# Patient Record
Sex: FEMALE | Race: WHITE | NOT HISPANIC OR LATINO | Employment: FULL TIME | URBAN - METROPOLITAN AREA
[De-identification: names, ages, dates, MRNs, and addresses within clinical notes are randomized per-mention and may not be internally consistent; named-entity substitution may affect disease eponyms.]

---

## 2023-02-16 PROBLEM — J45.909 ASTHMA (HHS-HCC): Status: ACTIVE | Noted: 2023-02-16

## 2023-02-16 PROBLEM — I10 HYPERTENSION: Status: ACTIVE | Noted: 2023-02-16

## 2023-02-16 PROBLEM — E66.01 CLASS 2 SEVERE OBESITY WITH SERIOUS COMORBIDITY AND BODY MASS INDEX (BMI) OF 36.0 TO 36.9 IN ADULT (MULTI): Status: ACTIVE | Noted: 2023-02-16

## 2023-02-16 PROBLEM — E10.9 TYPE 1 DIABETES MELLITUS (MULTI): Status: ACTIVE | Noted: 2023-02-16

## 2023-02-16 PROBLEM — E78.5 HYPERLIPIDEMIA: Status: ACTIVE | Noted: 2023-02-16

## 2023-02-16 PROBLEM — E66.812 CLASS 2 SEVERE OBESITY WITH SERIOUS COMORBIDITY AND BODY MASS INDEX (BMI) OF 36.0 TO 36.9 IN ADULT: Status: ACTIVE | Noted: 2023-02-16

## 2023-02-16 PROBLEM — N64.89 BREAST ASYMMETRY: Status: ACTIVE | Noted: 2023-02-16

## 2023-02-16 RX ORDER — INSULIN DETEMIR 100 [IU]/ML
22 INJECTION, SOLUTION SUBCUTANEOUS NIGHTLY
COMMUNITY
Start: 2019-02-25

## 2023-02-16 RX ORDER — FLUTICASONE PROPIONATE AND SALMETEROL 100; 50 UG/1; UG/1
1 POWDER RESPIRATORY (INHALATION) 2 TIMES DAILY
COMMUNITY
Start: 2014-06-09 | End: 2023-03-30 | Stop reason: SDUPTHER

## 2023-02-16 RX ORDER — INSULIN ASPART 100 [IU]/ML
INJECTION, SOLUTION INTRAVENOUS; SUBCUTANEOUS
COMMUNITY
Start: 2017-08-10

## 2023-02-16 RX ORDER — ATORVASTATIN CALCIUM 10 MG/1
1 TABLET, FILM COATED ORAL 3 TIMES WEEKLY
COMMUNITY

## 2023-02-16 RX ORDER — LISINOPRIL 5 MG/1
1 TABLET ORAL DAILY
COMMUNITY
Start: 2015-01-22 | End: 2023-03-30 | Stop reason: SDUPTHER

## 2023-02-16 RX ORDER — CHOLECALCIFEROL (VITAMIN D3) 50 MCG
1 TABLET ORAL DAILY
COMMUNITY

## 2023-02-16 RX ORDER — ALBUTEROL SULFATE 90 UG/1
1-2 AEROSOL, METERED RESPIRATORY (INHALATION)
COMMUNITY
End: 2023-03-30 | Stop reason: SDUPTHER

## 2023-03-29 PROBLEM — Z00.00 HEALTHCARE MAINTENANCE: Status: ACTIVE | Noted: 2023-03-29

## 2023-03-29 ASSESSMENT — ENCOUNTER SYMPTOMS
CONSTIPATION: 0
NAUSEA: 0
BLOOD IN STOOL: 0
CHEST TIGHTNESS: 0
DIARRHEA: 0
ACTIVITY CHANGE: 0
ABDOMINAL PAIN: 0
UNEXPECTED WEIGHT CHANGE: 0
PALPITATIONS: 0
APPETITE CHANGE: 0
VOMITING: 0
SHORTNESS OF BREATH: 0

## 2023-03-29 NOTE — PROGRESS NOTES
"Subjective   Patient ID: Sadia Boggs is a 62 y.o. female who presents for Annual Exam (She is not fasting for blood work today.  Has upcoming appt next week with Dr. Okeefe.).  HPI    61 yo female presents for a physical and f/u      lhx DM-type 1 diagnosed in july 2014 when hospitalized for DKA.   endo-dr can.  Has upcoming appt and had recent labs    BMI 35   sees optho. wears glasses. no vision changes. no diabetic changes  no foot sores or paresthesias.     hx HTN-on lisinopril 5mg. tolerating well.    BP is up today    hx asthma-on advair 100/50; uses alb prn.   was diagnosed with asthma as a child.      sees gyn albert for exams.  9/2022 mammo- and then additional imaged in 11/2022- had biopsy 12/2022- benign;  recommended 6 mo fu  no breast changes.      8/2021 DEXA- normal     11/2022  cologuard-neg     Flu shot had  Tetanus 2022  Zostavax 2015; shingrix-had  pneumovax 2014  had covid shots     She denies any chest pains, palpitations, edema, shortness of breath, abdominal pains, reflux, nausea, vomiting, diarrhea, constipation, blood in stool, melena.    sees optho; no diabetic changes  sees dentist  no mole changes     is a teacher at [employer]     Review of Systems   Constitutional:  Negative for activity change, appetite change and unexpected weight change.   Respiratory:  Negative for chest tightness and shortness of breath.    Cardiovascular:  Negative for chest pain, palpitations and leg swelling.   Gastrointestinal:  Negative for abdominal pain, blood in stool, constipation, diarrhea, nausea and vomiting.       Objective     /88   Pulse 80   Temp 36.6 °C (97.9 °F)   Ht 1.676 m (5' 6\")   Wt 99.7 kg (219 lb 12.8 oz)   BMI 35.48 kg/m²     Physical Exam  Vitals and nursing note reviewed.   Constitutional:       Appearance: Normal appearance. She is normal weight.   HENT:      Head: Normocephalic and atraumatic.      Right Ear: Tympanic membrane, ear canal and " "external ear normal.      Left Ear: Tympanic membrane, ear canal and external ear normal.      Nose: Nose normal.      Mouth/Throat:      Mouth: Mucous membranes are moist.      Pharynx: Oropharynx is clear.   Eyes:      Extraocular Movements: Extraocular movements intact.      Conjunctiva/sclera: Conjunctivae normal.      Pupils: Pupils are equal, round, and reactive to light.   Cardiovascular:      Rate and Rhythm: Normal rate and regular rhythm.   Pulmonary:      Effort: Pulmonary effort is normal.      Breath sounds: Normal breath sounds.   Abdominal:      General: Abdomen is flat. Bowel sounds are normal.      Palpations: Abdomen is soft.   Musculoskeletal:         General: Normal range of motion.      Cervical back: Neck supple.   Skin:     General: Skin is warm and dry.   Neurological:      General: No focal deficit present.      Mental Status: She is alert and oriented to person, place, and time.   Psychiatric:         Mood and Affect: Mood normal.         Behavior: Behavior normal.         Thought Content: Thought content normal.         Judgment: Judgment normal.         /88   Pulse 80   Temp 36.6 °C (97.9 °F)   Ht 1.676 m (5' 6\")   Wt 99.7 kg (219 lb 12.8 oz)   BMI 35.48 kg/m²     No results found for: WBC, HGB, HCT, MCV, PLT    Chemistry    Lab Results   Component Value Date/Time     01/16/2020 0819    K 4.0 01/16/2020 0819     01/16/2020 0819    CO2 28 01/16/2020 0819    BUN 11 01/16/2020 0819    CREATININE 0.81 01/16/2020 0819    Lab Results   Component Value Date/Time    CALCIUM 9.6 01/16/2020 0819           Lab Results   Component Value Date    CHOL 145 12/29/2022     Lab Results   Component Value Date    HDL 66.0 12/29/2022     Lab Results   Component Value Date    LDLCALC 66 12/29/2022     Lab Results   Component Value Date    TRIG 58 12/29/2022     No components found for: CHOLHDL    Assessment/Plan   Problem List Items Addressed This Visit          Respiratory    Asthma    " Relevant Medications    fluticasone propion-salmeteroL (Advair Diskus) 100-50 mcg/dose diskus inhaler    albuterol 90 mcg/actuation inhaler       Circulatory    Hypertension    Relevant Medications    lisinopril 10 mg tablet       Other    Hyperlipidemia    Relevant Orders    Lipid Panel (Completed)    Healthcare maintenance - Primary     Other Visit Diagnoses       Type 2 diabetes mellitus without complication, without long-term current use of insulin (CMS/[facility])        Relevant Orders    Albumin, urine, random (Completed)    Hemoglobin A1C (Completed)    Fatigue, unspecified type        Relevant Orders    TSH (Completed)    Abnormal mammogram of both breasts        Relevant Orders    BI mammo bilateral diagnostic    BI US breast complete bilateral          f/u with gyn for exam  11/2022 mammo; order given fu 6 mo  fu  11/2022 cologuard-neg  healthy lifestyle-diet, exercise.   rx refills sent   Recent labs with endo  cont to monitor BP  inc lisinopril to 10mg/day  immunizations are UTD

## 2023-03-30 ENCOUNTER — OFFICE VISIT (OUTPATIENT)
Dept: PRIMARY CARE | Facility: CLINIC | Age: 63
End: 2023-03-30
Payer: COMMERCIAL

## 2023-03-30 VITALS
HEART RATE: 80 BPM | BODY MASS INDEX: 35.32 KG/M2 | DIASTOLIC BLOOD PRESSURE: 88 MMHG | HEIGHT: 66 IN | TEMPERATURE: 97.9 F | SYSTOLIC BLOOD PRESSURE: 138 MMHG | WEIGHT: 219.8 LBS

## 2023-03-30 DIAGNOSIS — Z00.00 HEALTHCARE MAINTENANCE: Primary | ICD-10-CM

## 2023-03-30 DIAGNOSIS — R53.83 FATIGUE, UNSPECIFIED TYPE: ICD-10-CM

## 2023-03-30 DIAGNOSIS — I10 HYPERTENSION, UNSPECIFIED TYPE: ICD-10-CM

## 2023-03-30 DIAGNOSIS — J45.909 ASTHMA, UNSPECIFIED ASTHMA SEVERITY, UNSPECIFIED WHETHER COMPLICATED, UNSPECIFIED WHETHER PERSISTENT (HHS-HCC): ICD-10-CM

## 2023-03-30 DIAGNOSIS — R92.8 ABNORMAL MAMMOGRAM OF BOTH BREASTS: ICD-10-CM

## 2023-03-30 DIAGNOSIS — E11.9 TYPE 2 DIABETES MELLITUS WITHOUT COMPLICATION, WITHOUT LONG-TERM CURRENT USE OF INSULIN (MULTI): ICD-10-CM

## 2023-03-30 DIAGNOSIS — E78.5 HYPERLIPIDEMIA, UNSPECIFIED HYPERLIPIDEMIA TYPE: ICD-10-CM

## 2023-03-30 LAB
ALBUMIN/CREATININE (UG/MG) IN URINE EXTERNAL: 4 UG/MG CREAT
CHOLESTEROL (MG/DL) IN SER/PLAS EXTERNAL: 145 MG/DL
CHOLESTEROL IN HDL (MG/DL) IN SER/PLAS EXTERNAL: 66 MG/DL
CHOLESTEROL IN LDL (MG/DL) IN SERUM OR PLASMA BY CALCULATION EXTERNAL: 66 MG/DL
HEMOGLOBIN A1C/HEMOGLOBIN TOTAL IN BLOOD EXTERNAL: 5.6 %
THYROTROPIN (MIU/L) IN SER/PLAS BY DETECTION LIMIT <= 0.05 MIU/L EXTERNAL: 1.07 MIU/L
TRIGLYCERIDE (MG/DL) IN SER/PLAS EXTERNAL: 58 MG/DL

## 2023-03-30 PROCEDURE — 3075F SYST BP GE 130 - 139MM HG: CPT | Performed by: FAMILY MEDICINE

## 2023-03-30 PROCEDURE — 3079F DIAST BP 80-89 MM HG: CPT | Performed by: FAMILY MEDICINE

## 2023-03-30 PROCEDURE — 4010F ACE/ARB THERAPY RXD/TAKEN: CPT | Performed by: FAMILY MEDICINE

## 2023-03-30 PROCEDURE — 99396 PREV VISIT EST AGE 40-64: CPT | Performed by: FAMILY MEDICINE

## 2023-03-30 PROCEDURE — 1036F TOBACCO NON-USER: CPT | Performed by: FAMILY MEDICINE

## 2023-03-30 RX ORDER — LISINOPRIL 10 MG/1
5 TABLET ORAL DAILY
Qty: 90 TABLET | Refills: 1 | Status: SHIPPED | OUTPATIENT
Start: 2023-03-30 | End: 2023-05-25

## 2023-03-30 RX ORDER — FLUTICASONE PROPIONATE AND SALMETEROL 100; 50 UG/1; UG/1
1 POWDER RESPIRATORY (INHALATION) 2 TIMES DAILY
Qty: 60 EACH | Refills: 2 | Status: SHIPPED | OUTPATIENT
Start: 2023-03-30 | End: 2023-05-08

## 2023-03-30 RX ORDER — ALBUTEROL SULFATE 90 UG/1
1-2 AEROSOL, METERED RESPIRATORY (INHALATION) EVERY 6 HOURS PRN
Qty: 18 G | Refills: 2 | Status: SHIPPED | OUTPATIENT
Start: 2023-03-30

## 2023-03-30 ASSESSMENT — PATIENT HEALTH QUESTIONNAIRE - PHQ9
SUM OF ALL RESPONSES TO PHQ9 QUESTIONS 1 AND 2: 0
2. FEELING DOWN, DEPRESSED OR HOPELESS: NOT AT ALL
1. LITTLE INTEREST OR PLEASURE IN DOING THINGS: NOT AT ALL

## 2023-05-06 DIAGNOSIS — J45.909 ASTHMA, UNSPECIFIED ASTHMA SEVERITY, UNSPECIFIED WHETHER COMPLICATED, UNSPECIFIED WHETHER PERSISTENT (HHS-HCC): ICD-10-CM

## 2023-05-08 RX ORDER — CEPHALEXIN 250 MG/1
CAPSULE ORAL
Qty: 180 EACH | Refills: 1 | Status: SHIPPED | OUTPATIENT
Start: 2023-05-08 | End: 2023-11-26

## 2023-05-25 DIAGNOSIS — I10 HYPERTENSION, UNSPECIFIED TYPE: ICD-10-CM

## 2023-05-25 RX ORDER — LISINOPRIL 10 MG/1
TABLET ORAL
Qty: 90 TABLET | Refills: 1 | Status: SHIPPED | OUTPATIENT
Start: 2023-05-25 | End: 2023-11-15

## 2023-07-01 LAB — HEMOGLOBIN A1C/HEMOGLOBIN TOTAL IN BLOOD EXTERNAL: 5.6 %

## 2023-10-12 ENCOUNTER — TELEPHONE (OUTPATIENT)
Dept: PRIMARY CARE | Facility: CLINIC | Age: 63
End: 2023-10-12
Payer: COMMERCIAL

## 2023-10-12 DIAGNOSIS — Z23 ENCOUNTER FOR IMMUNIZATION: ICD-10-CM

## 2023-10-12 DIAGNOSIS — R92.8 ABNORMAL MAMMOGRAM OF BOTH BREASTS: ICD-10-CM

## 2023-10-12 DIAGNOSIS — Z12.31 SCREENING MAMMOGRAM FOR BREAST CANCER: ICD-10-CM

## 2023-10-12 NOTE — TELEPHONE ENCOUNTER
Pt had a mammogram done 6 months ago and she was told to get another mammogram done as a Diagnostic mammogram for both breasts.

## 2023-10-19 ENCOUNTER — CLINICAL SUPPORT (OUTPATIENT)
Dept: PRIMARY CARE | Facility: CLINIC | Age: 63
End: 2023-10-19
Payer: COMMERCIAL

## 2023-10-19 DIAGNOSIS — Z23 ENCOUNTER FOR IMMUNIZATION: ICD-10-CM

## 2023-10-19 PROCEDURE — 90471 IMMUNIZATION ADMIN: CPT | Performed by: FAMILY MEDICINE

## 2023-10-19 PROCEDURE — 90686 IIV4 VACC NO PRSV 0.5 ML IM: CPT | Performed by: FAMILY MEDICINE

## 2023-11-15 DIAGNOSIS — I10 HYPERTENSION, UNSPECIFIED TYPE: ICD-10-CM

## 2023-11-15 RX ORDER — LISINOPRIL 10 MG/1
TABLET ORAL
Qty: 90 TABLET | Refills: 1 | Status: SHIPPED | OUTPATIENT
Start: 2023-11-15 | End: 2024-03-21 | Stop reason: DRUGHIGH

## 2023-11-26 DIAGNOSIS — J45.909 ASTHMA, UNSPECIFIED ASTHMA SEVERITY, UNSPECIFIED WHETHER COMPLICATED, UNSPECIFIED WHETHER PERSISTENT (HHS-HCC): ICD-10-CM

## 2023-11-26 RX ORDER — CEPHALEXIN 250 MG/1
1 CAPSULE ORAL 2 TIMES DAILY
Qty: 180 EACH | Refills: 1 | Status: SHIPPED | OUTPATIENT
Start: 2023-11-26 | End: 2024-03-21 | Stop reason: SDUPTHER

## 2023-12-18 ENCOUNTER — TELEPHONE (OUTPATIENT)
Dept: PRIMARY CARE | Facility: CLINIC | Age: 63
End: 2023-12-18
Payer: COMMERCIAL

## 2023-12-18 NOTE — TELEPHONE ENCOUNTER
----- Message from Magui Fay DO sent at 12/15/2023  1:01 PM EST -----  Let pt know her mammogram showed the right breast asymmetry is likely benign and the rest of the mammogram was normal.  Repeat in 1 year.  ----- Message -----  From: Victor Hugo Wynn  Sent: 12/15/2023  12:35 PM EST  To: Magui Fay DO

## 2024-02-06 LAB
ALBUMIN/CREATININE (UG/MG) IN URINE EXTERNAL: 2 UG/MG CREAT
HEMOGLOBIN A1C/HEMOGLOBIN TOTAL IN BLOOD EXTERNAL: 5.9 %

## 2024-03-20 NOTE — PROGRESS NOTES
"Subjective   Patient ID: Sadia Boggs is a 63 y.o. female who presents for Annual Exam.    HPI   64 yo female presents for a physical and f/u      lhx DM-type 1 diagnosed in july 2014 when hospitalized for DKA.   endo-dr can.      BMI 35   sees optho. wears glasses. no vision changes.   no diabetic changes  no foot sores or paresthesias.     hx HTN-on lisinopril 10mg. tolerating well.    BP is up today a little  Doesn't check bp at home    hx asthma-on advair 100/50; uses alb prn.   was diagnosed with asthma as a child.      Used to see gyn albert for exams.  2017 Pap here-neg No hx of abnormal paps  No PMB or pelvic pains  No breast changes    9/2022 mammo- and then additional imaged in 11/2022- had biopsy 12/2022- benign;    12/2023 mammo- stable; fu 1 yr     8/2021 DEXA- normal     11/2022  cologuard-neg     Flu shot had  Tetanus 2022  Zostavax 2015; shingrix-had  pneumovax 2014  had covid shots  RSV- not yet  Prevnar 20- will get at 64y     She denies any chest pains, palpitations, edema, shortness of breath, abdominal pains, reflux, nausea, vomiting, diarrhea, constipation, blood in stool, melena.      sees optho; no diabetic changes  sees dentist  no mole changes     is a teacher at [employer]      Review of Systems  ROS as stated in the HPI  Objective   /89   Pulse 78   Temp 36.8 °C (98.3 °F)   Ht 1.676 m (5' 6\")   Wt 99 kg (218 lb 3.2 oz)   BMI 35.22 kg/m²     Physical Exam  Constitutional: A&O; NAD  HEENT: conjunctiva normal. EOMI; PERRLA; lids normal; TM's clear;   Neck: supple; No lymphadenopathy   Heart: RRR     Lungs: CTA bilateral   Abd: Soft, Nontender, Nondistended  Ext:  No edema;    Neuro: No gross neuro deficits     Psych: Judgment, orientation, mood, affect, insight wnl  Assessment/Plan   Problem List Items Addressed This Visit             ICD-10-CM    Breast asymmetry N64.89    Relevant Orders    BI mammo bilateral diagnostic tomosynthesis (Completed)    " Asthma J45.909    Relevant Medications    fluticasone propion-salmeteroL (Advair Diskus) 100-50 mcg/dose diskus inhaler    Hypertension I10    Relevant Medications    lisinopril 20 mg tablet    Healthcare maintenance - Primary Z00.00    Relevant Orders    CBC    Lipid Panel    TSH with reflex to Free T4 if abnormal     Other Visit Diagnoses         Codes    Breast cancer screening by mammogram     Z12.31    Relevant Orders    BI mammo bilateral diagnostic tomosynthesis (Completed)          1/2024 HbA1c and urine MA per endo  Planning labs in may with endo- cmp/hba1c; will check lipids, cbc, tsh as well  f/u  here for pap  12/2023 mammo; order given for 12/2024 11/2022 cologuard-neg  healthy lifestyle-diet, exercise.   rx refills sent   monitor BP  inc lisinopril to 20mg/day  RSV- not yet  Prevnar 20 recommended at 65  immunizations are UTD

## 2024-03-21 ENCOUNTER — OFFICE VISIT (OUTPATIENT)
Dept: PRIMARY CARE | Facility: CLINIC | Age: 64
End: 2024-03-21
Payer: COMMERCIAL

## 2024-03-21 ENCOUNTER — TELEPHONE (OUTPATIENT)
Dept: PRIMARY CARE | Facility: CLINIC | Age: 64
End: 2024-03-21

## 2024-03-21 VITALS
DIASTOLIC BLOOD PRESSURE: 89 MMHG | BODY MASS INDEX: 35.07 KG/M2 | HEART RATE: 78 BPM | HEIGHT: 66 IN | SYSTOLIC BLOOD PRESSURE: 143 MMHG | TEMPERATURE: 98.3 F | WEIGHT: 218.2 LBS

## 2024-03-21 DIAGNOSIS — I10 HYPERTENSION, UNSPECIFIED TYPE: ICD-10-CM

## 2024-03-21 DIAGNOSIS — Z12.31 BREAST CANCER SCREENING BY MAMMOGRAM: ICD-10-CM

## 2024-03-21 DIAGNOSIS — J45.909 ASTHMA, UNSPECIFIED ASTHMA SEVERITY, UNSPECIFIED WHETHER COMPLICATED, UNSPECIFIED WHETHER PERSISTENT (HHS-HCC): ICD-10-CM

## 2024-03-21 DIAGNOSIS — Z00.00 HEALTHCARE MAINTENANCE: Primary | ICD-10-CM

## 2024-03-21 DIAGNOSIS — N64.89 BREAST ASYMMETRY: ICD-10-CM

## 2024-03-21 PROCEDURE — 99396 PREV VISIT EST AGE 40-64: CPT | Performed by: FAMILY MEDICINE

## 2024-03-21 PROCEDURE — 4010F ACE/ARB THERAPY RXD/TAKEN: CPT | Performed by: FAMILY MEDICINE

## 2024-03-21 PROCEDURE — 3079F DIAST BP 80-89 MM HG: CPT | Performed by: FAMILY MEDICINE

## 2024-03-21 PROCEDURE — 1036F TOBACCO NON-USER: CPT | Performed by: FAMILY MEDICINE

## 2024-03-21 PROCEDURE — 3044F HG A1C LEVEL LT 7.0%: CPT | Performed by: FAMILY MEDICINE

## 2024-03-21 PROCEDURE — 3077F SYST BP >= 140 MM HG: CPT | Performed by: FAMILY MEDICINE

## 2024-03-21 RX ORDER — FLUTICASONE PROPIONATE AND SALMETEROL 100; 50 UG/1; UG/1
1 POWDER RESPIRATORY (INHALATION) 2 TIMES DAILY
Qty: 180 EACH | Refills: 3 | Status: SHIPPED | OUTPATIENT
Start: 2024-03-21

## 2024-03-21 RX ORDER — LISINOPRIL 20 MG/1
20 TABLET ORAL DAILY
Qty: 90 TABLET | Refills: 1 | Status: SHIPPED | OUTPATIENT
Start: 2024-03-21

## 2024-03-21 RX ORDER — LISINOPRIL 10 MG/1
10 TABLET ORAL DAILY
Qty: 90 TABLET | Refills: 1 | Status: CANCELLED | OUTPATIENT
Start: 2024-03-21

## 2024-03-21 ASSESSMENT — PATIENT HEALTH QUESTIONNAIRE - PHQ9
1. LITTLE INTEREST OR PLEASURE IN DOING THINGS: NOT AT ALL
2. FEELING DOWN, DEPRESSED OR HOPELESS: NOT AT ALL
SUM OF ALL RESPONSES TO PHQ9 QUESTIONS 1 AND 2: 0

## 2024-03-21 NOTE — TELEPHONE ENCOUNTER
Pt called to provide info about the lab orders she has from the other provider. They are CMP and Hgb A1C. Please advise if she needs more labs to be add on.

## 2024-04-04 ENCOUNTER — APPOINTMENT (OUTPATIENT)
Dept: PRIMARY CARE | Facility: CLINIC | Age: 64
End: 2024-04-04
Payer: COMMERCIAL

## 2024-05-08 ENCOUNTER — TELEPHONE (OUTPATIENT)
Dept: PRIMARY CARE | Facility: CLINIC | Age: 64
End: 2024-05-08
Payer: COMMERCIAL

## 2024-05-08 NOTE — TELEPHONE ENCOUNTER
----- Message from Magui Fay DO sent at 5/3/2024  2:31 PM EDT -----  Please let pt know that her blood counts and thyroid function are normal and her cholesterol was good.     ----- Message -----  From: Kerry London CMA  Sent: 5/3/2024  12:53 PM EDT  To: Magui Fay DO

## 2024-07-28 NOTE — PROGRESS NOTES
"Subjective   Patient ID: Sadia Boggs is a 64 y.o. female who presents for Gynecologic Exam (PAP today. Prevnar 20 today. ).    HPI   65 yo female presents for a pap and f/u      Was seen in march for physical    hx DM-type 1 diagnosed in july 2014 when hospitalized for DKA.   endo-dr can.      BMI 35   sees optho. no diabetic changes     hx HTN-on lisinopril 20mg    BP was up in march and inc lisinopril from 10 to 20mg  Doesn't check bp at home      Used to see gyn albert for exams.  2017 Pap here-neg No hx of abnormal paps  No PMB or pelvic pains  No breast changes  9/2022 mammo- and then additional imaged in 11/2022- had biopsy 12/2022- benign;    12/2023 mammo- stable; fu 1 yr     8/2021 DEXA- normal     11/2022  cologuard-neg     Flu shot had  Tetanus 2022  Zostavax 2015; shingrix-had  pneumovax 2014  had covid shots  RSV- not yet  Prevnar 20- will get today    She denies any chest pains, palpitations, edema, shortness of breath, abdominal pains, reflux, nausea, vomiting, diarrhea, constipation, blood in stool, melena.     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 EOMI; PERRLA; lids normal; TM's clear;   Neck: supple; No lymphadenopathy   Heart: RRR     Lungs: CTA bilateral   Abd: Soft, Nontender, Nondistended  Ext:  No edema;    Neuro: No gross neuro deficits     Psych: Judgment, orientation, mood, affect, insight wnl   Breast:  inspection normal.  No masses to palpation; No nipple discharge.  No asymmetry.  Axillae normal.   Genital: External vaginal area, cervix, uterus, adnexa wnl.  CMT  absent  Assessment/Plan   Problem List Items Addressed This Visit             ICD-10-CM    " Hyperlipidemia E78.5    Hypertension - Primary I10    Relevant Medications    lisinopril 30 mg tablet     Other Visit Diagnoses         Codes    Cervical cancer screening     Z12.4    Relevant Orders    THINPREP PAP TEST    Encounter for immunization     Z23    Relevant Orders    Pneumococcal conjugate vaccine, 20-valent (PREVNAR 20) (Completed)          urine MA per endo  5/2024 cbc, tsh, lipids were good; HbA1c/cmp from endo 5.9  Pap and pelvic exam done today.   Defers breast exam  monthly self breast exams.   12/2023 mammo; has order to recheck 12/2024 at [facility]  11/2022 cologuard-neg  healthy lifestyle-diet, exercise.   Inc lisinopril from 20 to 30mg;  monitor BP    RSV- not yet  Flu shot in fall  Prevnar 20 given today  Had shingrix and covid shots  Will be going on - fu for welcome to  visit

## 2024-07-30 ENCOUNTER — APPOINTMENT (OUTPATIENT)
Dept: PRIMARY CARE | Facility: CLINIC | Age: 64
End: 2024-07-30
Payer: COMMERCIAL

## 2024-07-30 VITALS
SYSTOLIC BLOOD PRESSURE: 144 MMHG | WEIGHT: 217.6 LBS | HEART RATE: 74 BPM | HEIGHT: 66 IN | BODY MASS INDEX: 34.97 KG/M2 | TEMPERATURE: 98.1 F | DIASTOLIC BLOOD PRESSURE: 88 MMHG

## 2024-07-30 DIAGNOSIS — E78.5 HYPERLIPIDEMIA, UNSPECIFIED HYPERLIPIDEMIA TYPE: ICD-10-CM

## 2024-07-30 DIAGNOSIS — I10 HYPERTENSION, UNSPECIFIED TYPE: Primary | ICD-10-CM

## 2024-07-30 DIAGNOSIS — Z23 ENCOUNTER FOR IMMUNIZATION: ICD-10-CM

## 2024-07-30 DIAGNOSIS — Z12.4 CERVICAL CANCER SCREENING: ICD-10-CM

## 2024-07-30 LAB — HEMOGLOBIN A1C/HEMOGLOBIN TOTAL IN BLOOD EXTERNAL: 5.9 %

## 2024-07-30 PROCEDURE — 90471 IMMUNIZATION ADMIN: CPT | Performed by: FAMILY MEDICINE

## 2024-07-30 PROCEDURE — 99214 OFFICE O/P EST MOD 30 MIN: CPT | Performed by: FAMILY MEDICINE

## 2024-07-30 PROCEDURE — 90677 PCV20 VACCINE IM: CPT | Performed by: FAMILY MEDICINE

## 2024-07-30 PROCEDURE — 88175 CYTOPATH C/V AUTO FLUID REDO: CPT

## 2024-07-30 RX ORDER — LISINOPRIL 30 MG/1
30 TABLET ORAL DAILY
Qty: 90 TABLET | Refills: 1 | Status: SHIPPED | OUTPATIENT
Start: 2024-07-30

## 2024-07-30 ASSESSMENT — PATIENT HEALTH QUESTIONNAIRE - PHQ9
2. FEELING DOWN, DEPRESSED OR HOPELESS: NOT AT ALL
1. LITTLE INTEREST OR PLEASURE IN DOING THINGS: NOT AT ALL
SUM OF ALL RESPONSES TO PHQ9 QUESTIONS 1 AND 2: 0

## 2024-08-08 ENCOUNTER — TELEPHONE (OUTPATIENT)
Dept: PRIMARY CARE | Facility: CLINIC | Age: 64
End: 2024-08-08
Payer: COMMERCIAL

## 2024-08-08 LAB
CYTOLOGY CMNT CVX/VAG CYTO-IMP: NORMAL
LAB AP HPV GENOTYPE QUESTION: YES
LAB AP HPV HR: NORMAL
LABORATORY COMMENT REPORT: NORMAL
PATH REPORT.TOTAL CANCER: NORMAL

## 2024-08-08 NOTE — TELEPHONE ENCOUNTER
----- Message from Magui Fay sent at 8/8/2024  9:28 AM EDT -----  Please let  pt know her pap test was normal.

## 2024-10-18 DIAGNOSIS — J45.909 ASTHMA, UNSPECIFIED ASTHMA SEVERITY, UNSPECIFIED WHETHER COMPLICATED, UNSPECIFIED WHETHER PERSISTENT (HHS-HCC): ICD-10-CM

## 2024-10-18 RX ORDER — ALBUTEROL SULFATE 90 UG/1
INHALANT RESPIRATORY (INHALATION)
Qty: 18 G | Refills: 2 | Status: SHIPPED | OUTPATIENT
Start: 2024-10-18

## 2024-12-13 ENCOUNTER — HOSPITAL ENCOUNTER (OUTPATIENT)
Dept: RADIOLOGY | Facility: EXTERNAL LOCATION | Age: 64
Discharge: HOME | End: 2024-12-13

## 2024-12-13 DIAGNOSIS — Z12.31 BREAST CANCER SCREENING BY MAMMOGRAM: ICD-10-CM

## 2024-12-13 DIAGNOSIS — N64.89 BREAST ASYMMETRY: ICD-10-CM

## 2025-01-02 DIAGNOSIS — R92.8 ABNORMALITY OF LEFT BREAST ON SCREENING MAMMOGRAPHY: Primary | ICD-10-CM

## 2025-01-15 NOTE — PROGRESS NOTES
"Subjective   Reason for Visit: Sadia Boggs is an 64 y.o. female here for a Medicare Wellness visit.     Past Medical, Surgical, and Family History reviewed and updated in chart.    Reviewed all medications by prescribing practitioner or clinical pharmacist (such as prescriptions, OTCs, herbal therapies and supplements) and documented in the medical record.    HPI  63 yo female presents for welcome to medicare    hx DM-type 1 diagnosed in july 2014 when hospitalized for DKA.   endo-dr can.      BMI 34   sees optho. no diabetic changes     hx HTN-on lisinopril 30mg (inc in July)  Doesn't check bp at home    Hx HLD- on atorvastatin 10mg 3x/wk  5/2024 lipids were good    2024 Pap -neg No hx of abnormal paps  No PMB or pelvic pains  No breast changes  9/2022 mammo- and then additional imaged in 11/2022- had r biopsy 12/2022- benign;    12/2024 mammo-  Finding 1:  The stable focal asymmetry in the right breast at 6 o'clock   is benign.     Finding 2:  Cyst in the left breast at 3 o'clock, 4 cm from the nipple is   probably benign. Follow-up with diagnostic mammogram with possible   ultrasound is recommended in 6 months.     8/2021 DEXA- normal   endo ordered another    11/2022  cologuard-neg     Flu shot had  Tetanus 2022  Zostavax 2015; shingrix-had  pneumovax 2014  had covid shots  RSV- not yet  Prevnar 20- had    She denies any chest pains, palpitations, edema, shortness of breath, abdominal pains, reflux, nausea, vomiting, diarrhea, constipation, blood in stool, melena.     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     /86   Pulse 68   Temp 36.8 °C (98.3 °F)   Ht 1.676 m (5' 6\")   Wt 97.9 kg (215 lb 12.8 oz)   BMI 34.83 kg/m²   Patient Self Assessment of Health Status  Patient Self Assessment: Good    Nutrition and Exercise  Current Diet: Well Balanced Diet  Adequate Fluid Intake: Yes  Caffeine: Yes  Exercise Frequency: Infrequently    Functional Ability/Level of " "Safety  Cognitive Impairment Observed: No cognitive impairment observed    Home Safety Risk Factors: None    Patient Care Team:  Magui Fay DO as PCP - General  Magui Fay DO as PCP - Ramos O PCP  Magui Fay DO as PCP - Michelle [employer] PCP  Roxanne Okeefe MD as Consulting Physician (Endocrinology)     Review of Systems  ROS as stated in HPI    Medicare Wellness Billing Compliance Satisfied    *This is a visual tool to show completion of required items on the day of the visit. Green checks will only appear on the date of visit.      Objective   Vitals:  /86   Pulse 68   Temp 36.8 °C (98.3 °F)   Ht 1.676 m (5' 6\")   Wt 97.9 kg (215 lb 12.8 oz)   BMI 34.83 kg/m²       Physical Exam  Constitutional: A&O; NAD  HEENT: conjunctiva normal. EOMI; PERRLA; lids normal; TM's clear;   Neck: supple; No lymphadenopathy   Heart: RRR     Lungs: CTA bilateral   Abd: Soft, Nontender, Nondistended  Ext:  No edema;    Neuro: No gross neuro deficits     Psych: Judgment, orientation, mood, affect, insight wnl   Assessment/Plan   Problem List Items Addressed This Visit       Hypertension    Relevant Medications    lisinopril 40 mg tablet     Other Visit Diagnoses       Welcome to Medicare preventive visit    -  Primary    Relevant Orders    Visual acuity screening (Completed)    ECG 12 lead (Clinic Performed) (Completed)    Colon cancer screening        Relevant Orders    Cologuard® colon cancer screening    Pain of right hip        Relevant Orders    Disability Placard        Check EKG- sinus HR 63  Check vision screen  urine MA per john  Reviewed recent note/labs with endo.   7/2024 Pap-neg  12/2024 mammo; recheck in 6 mo at [facility]  11/2022 cologuard-neg  healthy lifestyle-diet, exercise.   Inc lisinopril to 40mg;  monitor BP    RSV- not yet  Had shingrix and covid shots, prevnar 20 and flu shot  Endo ordered dexa  Rx handicap placard;  defers xray or PT of hip at this time- will fu if " needed

## 2025-01-16 ENCOUNTER — APPOINTMENT (OUTPATIENT)
Dept: PRIMARY CARE | Facility: CLINIC | Age: 65
End: 2025-01-16
Payer: COMMERCIAL

## 2025-01-16 VITALS
DIASTOLIC BLOOD PRESSURE: 86 MMHG | WEIGHT: 215.8 LBS | BODY MASS INDEX: 34.68 KG/M2 | TEMPERATURE: 98.3 F | SYSTOLIC BLOOD PRESSURE: 138 MMHG | HEIGHT: 66 IN | HEART RATE: 68 BPM

## 2025-01-16 DIAGNOSIS — Z00.00 WELCOME TO MEDICARE PREVENTIVE VISIT: Primary | ICD-10-CM

## 2025-01-16 DIAGNOSIS — I10 HYPERTENSION, UNSPECIFIED TYPE: ICD-10-CM

## 2025-01-16 DIAGNOSIS — Z12.11 COLON CANCER SCREENING: ICD-10-CM

## 2025-01-16 DIAGNOSIS — M25.551 PAIN OF RIGHT HIP: ICD-10-CM

## 2025-01-16 PROBLEM — E66.01 CLASS 2 SEVERE OBESITY WITH SERIOUS COMORBIDITY AND BODY MASS INDEX (BMI) OF 36.0 TO 36.9 IN ADULT: Status: RESOLVED | Noted: 2023-02-16 | Resolved: 2025-01-16

## 2025-01-16 PROBLEM — E10.9 TYPE 1 DIABETES MELLITUS: Status: RESOLVED | Noted: 2023-02-16 | Resolved: 2025-01-16

## 2025-01-16 PROBLEM — E66.812 CLASS 2 SEVERE OBESITY WITH SERIOUS COMORBIDITY AND BODY MASS INDEX (BMI) OF 36.0 TO 36.9 IN ADULT: Status: RESOLVED | Noted: 2023-02-16 | Resolved: 2025-01-16

## 2025-01-16 PROCEDURE — G0403 EKG FOR INITIAL PREVENT EXAM: HCPCS | Performed by: FAMILY MEDICINE

## 2025-01-16 PROCEDURE — G0402 INITIAL PREVENTIVE EXAM: HCPCS | Performed by: FAMILY MEDICINE

## 2025-01-16 PROCEDURE — 1170F FXNL STATUS ASSESSED: CPT | Performed by: FAMILY MEDICINE

## 2025-01-16 PROCEDURE — 3075F SYST BP GE 130 - 139MM HG: CPT | Performed by: FAMILY MEDICINE

## 2025-01-16 PROCEDURE — 3078F DIAST BP <80 MM HG: CPT | Performed by: FAMILY MEDICINE

## 2025-01-16 PROCEDURE — 1123F ACP DISCUSS/DSCN MKR DOCD: CPT | Performed by: FAMILY MEDICINE

## 2025-01-16 PROCEDURE — 3008F BODY MASS INDEX DOCD: CPT | Performed by: FAMILY MEDICINE

## 2025-01-16 PROCEDURE — 1159F MED LIST DOCD IN RCRD: CPT | Performed by: FAMILY MEDICINE

## 2025-01-16 PROCEDURE — 1036F TOBACCO NON-USER: CPT | Performed by: FAMILY MEDICINE

## 2025-01-16 PROCEDURE — 1160F RVW MEDS BY RX/DR IN RCRD: CPT | Performed by: FAMILY MEDICINE

## 2025-01-16 RX ORDER — LISINOPRIL 40 MG/1
40 TABLET ORAL DAILY
Qty: 90 TABLET | Refills: 3 | Status: SHIPPED | OUTPATIENT
Start: 2025-01-16 | End: 2026-02-20

## 2025-01-16 ASSESSMENT — ACTIVITIES OF DAILY LIVING (ADL)
MANAGING_FINANCES: INDEPENDENT
DRESSING: INDEPENDENT
DOING_HOUSEWORK: INDEPENDENT
TAKING_MEDICATION: INDEPENDENT
GROCERY_SHOPPING: INDEPENDENT
BATHING: INDEPENDENT

## 2025-01-16 ASSESSMENT — VISUAL ACUITY
OD_CC: 20/25
OS_CC: 20/25

## 2025-03-27 LAB — HEMOGLOBIN A1C/HEMOGLOBIN TOTAL IN BLOOD EXTERNAL: 6.2 %

## 2025-04-19 DIAGNOSIS — J45.909 ASTHMA, UNSPECIFIED ASTHMA SEVERITY, UNSPECIFIED WHETHER COMPLICATED, UNSPECIFIED WHETHER PERSISTENT (HHS-HCC): ICD-10-CM

## 2025-04-21 RX ORDER — FLUTICASONE PROPIONATE AND SALMETEROL 100; 50 UG/1; UG/1
1 POWDER RESPIRATORY (INHALATION) 2 TIMES DAILY
Qty: 180 EACH | Refills: 3 | Status: SHIPPED | OUTPATIENT
Start: 2025-04-21

## 2025-06-12 LAB
GLOMERULAR FILTRATION RATE ML/MIN/1.73 SQ M.PREDICTED EXTERNAL: 97 ML/MIN/1.73M*2
HEMOGLOBIN A1C/HEMOGLOBIN TOTAL IN BLOOD EXTERNAL: 6 %

## 2025-06-24 NOTE — PROGRESS NOTES
Subjective   Patient ID: Sadia Boggs is a 64 y.o. female who presents for No chief complaint on file..    HPI   64-year-old female presents complaining of hip pain    Review of Systems  ROS as stated in HPI    Objective   There were no vitals taken for this visit.    Physical Exam  Constitutional: A&O; NAD  HEENT: conjunctiva normal. EOMI; PERRLA; lids normal; TM's clear;   Neck: supple; No lymphadenopathy   Heart: RRR     Lungs: CTA bilateral   Abd: Soft, Nontender, Nondistended  Ext:  No edema;    Neuro: No gross neuro deficits     Psych: Judgment, orientation, mood, affect, insight wnl   Assessment/Plan   {Assess/PlanSmartLinks:[id number]}                 check xray of hips and refer to PT  fu with ortho if persists  monitor BP- add HCTZ  sees endo  had mammo this morning-official report pending but paperwork recommend 6-month follow-up diagnostic bilateral-orders given

## 2025-06-30 ENCOUNTER — APPOINTMENT (OUTPATIENT)
Dept: PRIMARY CARE | Facility: CLINIC | Age: 65
End: 2025-06-30
Payer: MEDICARE

## 2025-06-30 VITALS
SYSTOLIC BLOOD PRESSURE: 172 MMHG | WEIGHT: 220 LBS | TEMPERATURE: 98.3 F | BODY MASS INDEX: 35.36 KG/M2 | HEART RATE: 84 BPM | DIASTOLIC BLOOD PRESSURE: 103 MMHG | HEIGHT: 66 IN

## 2025-06-30 DIAGNOSIS — N64.89 BREAST ASYMMETRY: ICD-10-CM

## 2025-06-30 DIAGNOSIS — M25.551 PAIN OF RIGHT HIP: Primary | ICD-10-CM

## 2025-06-30 DIAGNOSIS — I10 HYPERTENSION, UNSPECIFIED TYPE: ICD-10-CM

## 2025-06-30 DIAGNOSIS — E66.01 OBESITY, MORBID (MULTI): ICD-10-CM

## 2025-06-30 DIAGNOSIS — E78.5 HYPERLIPIDEMIA, UNSPECIFIED HYPERLIPIDEMIA TYPE: ICD-10-CM

## 2025-06-30 DIAGNOSIS — Z12.31 BREAST CANCER SCREENING BY MAMMOGRAM: ICD-10-CM

## 2025-06-30 PROCEDURE — 99214 OFFICE O/P EST MOD 30 MIN: CPT | Performed by: FAMILY MEDICINE

## 2025-06-30 PROCEDURE — 1036F TOBACCO NON-USER: CPT | Performed by: FAMILY MEDICINE

## 2025-06-30 PROCEDURE — 3008F BODY MASS INDEX DOCD: CPT | Performed by: FAMILY MEDICINE

## 2025-06-30 PROCEDURE — 3080F DIAST BP >= 90 MM HG: CPT | Performed by: FAMILY MEDICINE

## 2025-06-30 PROCEDURE — 3077F SYST BP >= 140 MM HG: CPT | Performed by: FAMILY MEDICINE

## 2025-06-30 PROCEDURE — 1160F RVW MEDS BY RX/DR IN RCRD: CPT | Performed by: FAMILY MEDICINE

## 2025-06-30 PROCEDURE — 1159F MED LIST DOCD IN RCRD: CPT | Performed by: FAMILY MEDICINE

## 2025-06-30 RX ORDER — HYDROCHLOROTHIAZIDE 25 MG/1
25 TABLET ORAL DAILY
Qty: 90 TABLET | Refills: 1 | Status: SHIPPED | OUTPATIENT
Start: 2025-06-30

## 2025-06-30 ASSESSMENT — PATIENT HEALTH QUESTIONNAIRE - PHQ9
1. LITTLE INTEREST OR PLEASURE IN DOING THINGS: NOT AT ALL
SUM OF ALL RESPONSES TO PHQ9 QUESTIONS 1 AND 2: 0
2. FEELING DOWN, DEPRESSED OR HOPELESS: NOT AT ALL

## 2025-07-02 ENCOUNTER — HOSPITAL ENCOUNTER (OUTPATIENT)
Dept: RADIOLOGY | Facility: EXTERNAL LOCATION | Age: 65
Discharge: HOME | End: 2025-07-02

## 2025-07-02 DIAGNOSIS — R92.8 ABNORMALITY OF LEFT BREAST ON SCREENING MAMMOGRAPHY: ICD-10-CM

## 2025-07-03 ENCOUNTER — TELEPHONE (OUTPATIENT)
Dept: PRIMARY CARE | Facility: CLINIC | Age: 65
End: 2025-07-03
Payer: MEDICARE

## 2025-07-03 NOTE — TELEPHONE ENCOUNTER
----- Message from Magui Fay sent at 7/2/2025  1:27 PM EDT -----  Let pt know her xray showed severe degenerative changes in her right hip as well as some degenerative changes in her lower lumbar spine.  I recommend she try physical therapy but follow up with an   orthopedic if symptoms persist.  Let her know we can enter a referral   please enter dx Right hip pain  ----- Message -----  From: Kareem Mitchell - Imaging Results In  Sent: 7/2/2025   9:26 AM EDT  To: Magui Fay,

## 2025-07-29 PROBLEM — M25.551 PAIN IN RIGHT HIP: Status: ACTIVE | Noted: 2025-07-29

## 2025-07-29 NOTE — PROGRESS NOTES
PHYSICAL THERAPY EVALUATION AND TREATMENT    Patient Name: Sadia Boggs  MRN: [id number]  Today's Date: 7/30/2025  Time Calculation  Start Time: 0900  Stop Time: 0945  Time Calculation (min): 45 min    PT Evaluation Time Entry  PT Evaluation (Low) Time Entry: 25  PT Therapeutic Procedures Time Entry  Therapeutic Exercise Time Entry: 20                   Insurance:  Visit number: 1 (updated 07/30/25)  Insurance Type: Payor: ANTH MEDICARE / Plan: [facility] MEDICARE ADVANTAGE / Product Type: *No Product type* / $30 COPAY VISITS ARE MED NEC NEEDS AUTH [facility] PAYS % OOP 4150.00 [phone] APPLIED   Authorization or Plan of Care date Range: TBD  Copay: $30  Referred by: Magui Fay DO     Assessment:  PT Assessment  PT Assessment Results: Decreased strength, Decreased range of motion, Decreased endurance, Impaired balance, Pain  Rehab Prognosis: Good  Evaluation/Treatment Tolerance: Patient tolerated treatment well  Barriers to Participation: Insight into problems, Insurance, Premorbid level of function     Sadia Boggs  is a 65 y.o. old patient who participated in a physical therapy evaluation today due to R hip pain. Sadia presents with signs and symptoms consistent with osteoarthritis. Sadia's impairments include: balance deficits, gait deficits, pain, decreased strength, and decreased range of motion.   Due to these impairments, she has the following functional limitations and participation restrictions: difficulty with ambulation, difficulty with stair negotiation, difficulty performing household activities, difficulty performing recreational activities, difficulty performing occupational activities, difficulty with sleeping, difficulty with completing/performing some ADLs/IADLs, and increased time to complete ADLs/IADLs. Sadia's clinical presentation is Stable and/or uncomplicated characteristics with the level of complexity being low. Patricias potential for rehab is good.  Skilled physical therapy  "services are appropriate and beneficial in order to achieve measurable and meaningful change in the objective tests and measures. Utilization of skilled physical therapy services will aid in advancing her functional status and attaining her therapy-related goals. Sadia verbalized understanding and is in agreement with all goals and plan of care.  Sadia left session with all questions answered and no increase in symptoms.      Plan:  OP PT Plan  Treatment/Interventions: Aquatic therapy, Cryotherapy, Dry needling, Education/ Instruction, Electrical stimulation, Gait training, Hot pack, Manual therapy, Neuromuscular re-education, Self care/ home management, Taping techniques, Therapeutic activities, Therapeutic exercises  PT Plan: Skilled PT  PT Frequency: 1 time per week  Duration: 6 weeks  Rehab Potential: Good  Plan of Care Agreement: Patient    NV: Assess response to HEP. Progress R hip strengthening and flexibility.     Therapy Diagnosis:   Problem List Items Addressed This Visit           ICD-10-CM    Pain in right hip - Primary M25.551    Relevant Orders    Follow Up In Physical Therapy       Subjective    Sadia Boggs  is a 65 y.o. female  presenting to the clinic with chief complaint of R hip pain.    Onset: last year towards the end of the school year    WILBER: arthritis    Pain location:  R lateral hip with radiating to the quad  Pain description:  achy   6/10 at worst; 1/10 at best    Worse with:  night time, sitting too long and getting up from sitting, too much activity  Better with:  sitting    Denies N/T   Confirms radiating pain into R quad    Prior treatments/interventions:  Tylenol    Home: Lives with  and son in a split level home with 1 NERY  PLOF: IND  CLOF: IND  Functional limitations: experiences pain with lifting the RLE  Pt's goal: \" find little things that could help me relieve pain\"     Work: full-time teacher  Exercise: n/a  Hobbies: n/a    Diagnostic images:   6/30/25 X-RAY of R " "hip - Severe degenerative changes of the right hip. Pseudoarthrosis on the left at L5-S1, a described cause of back pain.     Medical History Form: Reviewed (scanned into chart)    Precautions:  Fall Risk: None    + Type I DM, medically managed; +HTN, medically managed; +asthma  Denies: CA, pacemaker, blood thinner medication use, latex allergy, epilepsy/seizures, or other known cardio/neuro/pulmonary problems   Denies unexpected weight loss/gain, night sweats, or night pain.    Previous surgeries include:  n/a    Objective   Outcome Measures:    LEFS: 35/80    Posture: shoulder rounded forward and head forward  Transfers: IND  Bed Mobility: IND  Gait: Pt ambulates IND with no assistive device. Demos antalgic gait and trendelenburg gait with R hip weakness. Reciprocal gait pattern observed.  Stairs: Pt ascends/descends 4 x 6\" steps with 2 Hrs IND. Non-reciprocal step pattern observed.    Dermatomes/Sensation BLE:  Mid-Anterior Thigh (L2) L: Intact, R: Intact  Medial Knee (L3) L: Intact, R: Intact  Medial Malleolus (L4) L: Intact, R: Intact  Dorsal Second Toe Web Space (L5) L: Intact, R: Intact  Lateral Malleolus (S1) L: Intact, R: Intact  Popliteal Fossa (S2) L: Intact, R: Intact     Myotomes/Strength (MMT in sitting): *indicates pain  Hip Flex (L2) L: 3+ , R: 3-*  Hip Add L: 4+ , R: 4  Hip Abd L: 4+ , R: 4  Hip Ext L: 2+ , R: 2+  Hip ER L: 4+ , R: 4-*  Hip IR L: 4+ , R: 4*  Knee Ext (L3) L: 4+ , R: 4  Knee Flex L: 5 , R: 4+  Great Toe Extension (L5) L: 4 , R: 4  Ankle DF L: 5 , R: 5  Ankle PF (S1) L: 5 , R: 5    Passive Hip Range of Motion: measured in degrees with goniometer  Hip Flex L/R: 82 / 98  Hip Abd L/R: 31 / 36  Hip ER L/R: 31 / 24  Hip IR L/R: 33 / 24    Special Tests:  Trendelenburg Sign: Left negative; Right positive    Palpation: tenderness to R femoral head    Treatments:    Assigned [facility]- Medbridge [id number]     Therapeutic Exercise:  20 minutes    1x10 bridges (added to HEP)  1x10 RLE johnson in L " s/l (added to HEP)  1x10 RLE reverse clamshells in L s/l (added to HEP)  1x10 each LE seated marches (added to HEP)  1x10 BLE seated hip abduction against green TB (added to HEP)    EDUCATION:  Outpatient Education  Individual(s) Educated: Patient  Education Provided: Anatomy, Home Exercise Program, Physiology, POC  Risk and Benefits Discussed with Patient/Caregiver/Other: yes  Patient/Caregiver Demonstrated Understanding: yes  Plan of Care Discussed and Agreed Upon: yes  Patient Response to Education: Patient/Caregiver Verbalized Understanding of Information, Patient/Caregiver Performed Return Demonstration of Exercises/Activities, Patient/Caregiver Asked Appropriate Questions    -Educated Sadia  on the role of PT and PT POC  -Educated Sadia regarding benefit and purpose of skilled PT services along with results of examination findings and how this correlates to their chief complaint.   -Answered Sadia's questions in full.  -Educated Sadia on relevant anatomy and the rationale for exercises  -Sadia Boggs advised to hold any ex if it increases pain, Sadia Boggs verbalized understanding    Goals:        STG's (within 2 weeks)  Sadia ALEYDA Ambrose will decrease pain by >/= 2/10 points from baseline to improve ability to perform household/recreational activities.    Sadia Boggs will be able to sleep through the night with less than 2 interruptions due to pain in order to improve mental and physical well-being in order to safely participate in ADLs.     Sadia MAYNARD Ambrose will demonstrate independence,  excellent understanding of and report compliance with at least 3 exercises in her HEP to facilitate the learning process to maximize PT benefits and to facilitate independent rehab program upon discharge.    LTG's (by discharge)    Sadia Boggs will improve R hip strength to >/=4+/5 in all planes for improved proximal hip stability with ambulation, standing and stair negotiation.    Sadia Boggs will  demonstrate symmetrical hip AROM in all planes for improved hip mobility and ability to complete household, recreational and occupational tasks without significant compensation.    Sadia Boggs will demonstrate reciprocal gait pattern with stair negotiation.    Sadia Boggs will improve LEFS score to by 9 points (MDC) to demonstrate significant improvement in the ability to complete household and community functional tasks with the lower extremity independently. Baseline: 35/80    Sadia Boggs will decrease pain to 0-2/10 to improve ability to perform household/recreational activities.    Sadia Boggs will be able to sleep through the night without waking due to pain in order to improve mental and physical well-being in order to safely participate in ADLs.     Sadia Boggs will demonstrate independence,  excellent understanding of and report compliance with HEP to facilitate the learning process to maximize PT benefits and to facilitate independent rehab program upon discharge.

## 2025-07-30 ENCOUNTER — EVALUATION (OUTPATIENT)
Dept: PHYSICAL THERAPY | Facility: CLINIC | Age: 65
End: 2025-07-30
Payer: MEDICARE

## 2025-07-30 DIAGNOSIS — M25.551 PAIN IN RIGHT HIP: Primary | ICD-10-CM

## 2025-07-30 PROCEDURE — 97161 PT EVAL LOW COMPLEX 20 MIN: CPT | Mod: GP

## 2025-07-30 PROCEDURE — 97110 THERAPEUTIC EXERCISES: CPT | Mod: GP

## 2025-07-30 ASSESSMENT — ENCOUNTER SYMPTOMS
OCCASIONAL FEELINGS OF UNSTEADINESS: 0
LOSS OF SENSATION IN FEET: 0
DEPRESSION: 0

## 2025-08-05 ENCOUNTER — TREATMENT (OUTPATIENT)
Dept: PHYSICAL THERAPY | Facility: CLINIC | Age: 65
End: 2025-08-05
Payer: MEDICARE

## 2025-08-05 DIAGNOSIS — M25.551 PAIN IN RIGHT HIP: Primary | ICD-10-CM

## 2025-08-05 PROCEDURE — 97110 THERAPEUTIC EXERCISES: CPT | Mod: GP

## 2025-08-05 NOTE — PROGRESS NOTES
Physical Therapy    Physical Therapy Treatment    Patient Name: Sadia Boggs  MRN: [id number]  Today's Date: 8/5/2025  Time Calculation  Start Time: 0935  Stop Time: 1020  Time Calculation (min): 45 min  Insurance - reviewed   Visit number: 2 (updated 08/05/25)   Payor: [employer] MEDICARE / Plan: charity: water MEDICARE ADVANTAGE / Product Type: *No Product type* /    **PT AUTH 9V 7/30-9/27/25** $30 COPAY VISITS ARE MED NEC NEEDS AUTH [facility] PAYS % OOP 4150.00 [phone]   Referring Provider: Magui Fay DO       Current Problem  Problem List Items Addressed This Visit           ICD-10-CM    Pain in right hip - Primary M25.551       Precautions:  Fall Risk: None    + Type I DM, medically managed; +HTN, medically managed; +asthma  Denies: CA, pacemaker, blood thinner medication use, latex allergy, epilepsy/seizures, or other known cardio/neuro/pulmonary problems   Denies unexpected weight loss/gain, night sweats, or night pain.  Previous surgeries include:  n/a    General  Subjective        Sadia Boggs denies any falls or complications since last session. Sadia Boggs reports school starts in 1.5 weeks. Sadia reports that she had increased pain yesterday without known WILBER.     Sadia Boggs reports compliance with HEP.    Pain:  5/10  Pain location: right hip      Objective     Treatments:  Abbreviation Key  NP = not performed this visit   NV = next visit  // = parallel    Assigned HEP- Medbridge Access Code: [email or web address]  Exercises  - Supine Bridge  - 1 x daily - 3-7 x weekly - 3 sets - 10 reps  - Clamshell  - 1 x daily - 3-7 x weekly - 3 sets - 10 reps  - Seated March  - removed from HEP due to clicking during  - Seated Hip Abduction with Resistance  - 1 x daily - 3-7 x weekly - 3 sets - 10 reps  - Sidelying Reverse Clamshell  - 1 x daily - 3-7 x weekly - 3 sets - 10 reps  - Standing March with Counter Support  - 1 x daily - 3-7 x weekly - 1-3 sets - 10 reps  - Side Stepping with Counter Support  - 1 x daily  - 3-7 x weekly - 1-3 sets - 10 reps  - Standing Hip Abduction with Counter Support  - 1 x daily - 3-7 x weekly - 1-3 sets - 10 reps  - Standing Hip Extension with Counter Support  - 1 x daily - 3-7 x weekly - 1-3 sets - 10 reps  - Seated Hip External Rotation with Resistance  - 1 x daily - 3-7 x weekly - 1-3 sets - 10 reps    Therapeutic Exercise:  45 minutes    NuStep, seat 9, UE 9 x 10 minutes  Reviewed and updated HEP and answered Sadia's questions including rationale for ex, to hold any ex that increases pain, strategies to improve tolerance to ex   In // bars with mirror for visual feedback   Standing hip flexion   Standing hip extension  Cues to avoid HS curl  Standing vs bent over plinth  Standing hip abduction  Able to perform 5 reps on the L in R stance but challenging  Side stepping  Seated hip ER with green band      Outpatient Education: Reviewed and updated home exercise program. Answered questions about home exercise program. Provided manual/visual cues with mirror for correct performance of exercises. Provided verbal feedback to improve exercise technique.   Sadia Boggs verbalizes understanding of all education provided: Yes    Assessment:  Sadia Boggs completed treatment today which focused upon strengthening progression in order to address chronic R hip pain.  Sadia presents with weakness R>L.  Sadia requires frequent cues to avoid compensation but able to demo proper technique after reps/cues.  Sadia was appropriately challenged with ex progression but denies any pain with ex.  Sadia's  response to tx was: Patient tolerated therapeutic interventions well and without any adverse events. Improved tolerance to strengthening progressions. Sadia's Progress towards goals: Patient reports there has not been a significant change in functional abilities. Sadia Boggs continues to have gait deficits, decreased strength, and pain limiting participation in household chores, recreational  activities, and occupational tasks Further skilled therapy services are warranted to address the remaining impairments in order to progress towards functional goals. Sadia left session with all questions answered and no increase in symptoms.      Plan:  Monitor response to updated HEP.  Progress R hip strengthening and flexibility.       Time Calculation  Start Time: 0935  Stop Time: 1020  Time Calculation (min): 45 min     PT Therapeutic Procedures Time Entry  Therapeutic Exercise Time Entry: 45

## 2025-08-13 ENCOUNTER — TREATMENT (OUTPATIENT)
Dept: PHYSICAL THERAPY | Facility: CLINIC | Age: 65
End: 2025-08-13
Payer: MEDICARE

## 2025-08-13 DIAGNOSIS — M25.551 PAIN IN RIGHT HIP: Primary | ICD-10-CM

## 2025-08-13 PROCEDURE — 97110 THERAPEUTIC EXERCISES: CPT | Mod: GP

## 2025-08-15 LAB — NONINV COLON CA DNA+OCC BLD SCRN STL QL: NEGATIVE

## 2025-08-21 ENCOUNTER — TREATMENT (OUTPATIENT)
Dept: PHYSICAL THERAPY | Facility: CLINIC | Age: 65
End: 2025-08-21
Payer: MEDICARE

## 2025-08-21 DIAGNOSIS — M25.551 PAIN IN RIGHT HIP: Primary | ICD-10-CM

## 2025-08-21 PROCEDURE — 97110 THERAPEUTIC EXERCISES: CPT | Mod: GP

## 2025-08-28 ENCOUNTER — TREATMENT (OUTPATIENT)
Dept: PHYSICAL THERAPY | Facility: CLINIC | Age: 65
End: 2025-08-28
Payer: MEDICARE

## 2025-08-28 DIAGNOSIS — M25.551 PAIN IN RIGHT HIP: Primary | ICD-10-CM

## 2025-08-28 PROCEDURE — 97112 NEUROMUSCULAR REEDUCATION: CPT | Mod: GP

## 2025-08-28 PROCEDURE — 97110 THERAPEUTIC EXERCISES: CPT | Mod: GP

## 2025-09-04 ENCOUNTER — TREATMENT (OUTPATIENT)
Dept: PHYSICAL THERAPY | Facility: CLINIC | Age: 65
End: 2025-09-04
Payer: MEDICARE

## 2025-09-04 DIAGNOSIS — M25.551 PAIN IN RIGHT HIP: Primary | ICD-10-CM

## 2025-09-04 PROCEDURE — 97112 NEUROMUSCULAR REEDUCATION: CPT | Mod: GP

## 2025-09-04 PROCEDURE — 97110 THERAPEUTIC EXERCISES: CPT | Mod: GP

## 2026-01-22 ENCOUNTER — APPOINTMENT (OUTPATIENT)
Dept: PRIMARY CARE | Facility: CLINIC | Age: 66
End: 2026-01-22
Payer: MEDICARE